# Patient Record
Sex: MALE | Race: BLACK OR AFRICAN AMERICAN | NOT HISPANIC OR LATINO | Employment: FULL TIME | URBAN - METROPOLITAN AREA
[De-identification: names, ages, dates, MRNs, and addresses within clinical notes are randomized per-mention and may not be internally consistent; named-entity substitution may affect disease eponyms.]

---

## 2024-05-16 ENCOUNTER — OFFICE VISIT (OUTPATIENT)
Dept: URGENT CARE | Facility: CLINIC | Age: 24
End: 2024-05-16

## 2024-05-16 ENCOUNTER — HOSPITAL ENCOUNTER (OUTPATIENT)
Dept: RADIOLOGY | Facility: HOSPITAL | Age: 24
Discharge: HOME | End: 2024-05-16
Attending: NURSE PRACTITIONER

## 2024-05-16 VITALS
OXYGEN SATURATION: 99 % | RESPIRATION RATE: 18 BRPM | HEART RATE: 57 BPM | WEIGHT: 165 LBS | HEIGHT: 71 IN | SYSTOLIC BLOOD PRESSURE: 123 MMHG | DIASTOLIC BLOOD PRESSURE: 69 MMHG | TEMPERATURE: 98 F | BODY MASS INDEX: 23.1 KG/M2

## 2024-05-16 DIAGNOSIS — M25.521 RIGHT ELBOW PAIN: ICD-10-CM

## 2024-05-16 DIAGNOSIS — S42.401A OCCULT CLOSED FRACTURE OF RIGHT ELBOW, INITIAL ENCOUNTER: Primary | ICD-10-CM
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
## 2024-05-16 DIAGNOSIS — M25.422 ELBOW EFFUSION, LEFT: ICD-10-CM

## 2024-05-16 PROCEDURE — 99214 OFFICE O/P EST MOD 30 MIN: CPT | Mod: PBBFAC,25 | Performed by: NURSE PRACTITIONER

## 2024-05-16 PROCEDURE — 99203 OFFICE O/P NEW LOW 30 MIN: CPT | Mod: S$PBB,,, | Performed by: NURSE PRACTITIONER

## 2024-05-16 PROCEDURE — 63600175 PHARM REV CODE 636 W HCPCS

## 2024-05-16 PROCEDURE — 73080 X-RAY EXAM OF ELBOW: CPT | Mod: TC,RT

## 2024-05-16 RX ORDER — KETOROLAC TROMETHAMINE 30 MG/ML
60 INJECTION, SOLUTION INTRAMUSCULAR; INTRAVENOUS
Status: COMPLETED | OUTPATIENT
Start: 2024-05-16 | End: 2024-05-16

## 2024-05-16 RX ADMIN — KETOROLAC TROMETHAMINE 60 MG: 30 INJECTION, SOLUTION INTRAMUSCULAR at 10:05

## 2024-05-16 NOTE — PROGRESS NOTES
"Notified patient that there is a "occult fracture" with effusion which indicates there is somewhat a reliable indication there may be an intraarticular fracture although no fracture was identified.   Ortho referral sent today.  Keep elbow splint in place and return to clinic for repeat xray In 7-10 days if Ortho evaluation has not been fulfilled. Pt verbalized understanding.  No PE/Sports/Dance/etc until cleared by Orthopedic MD.   -If you see color change in your fingers (blue, black, purple, white) go to ER.  -Tylenol or Motrin for pain. Motrin as prescribed   -Limit icing your wrist to maximum 3x/day, 10minutes/session.  -Wear the  splint every day to work/during activity. Remove for showering, sleeping, resting.  -If sudden severe pain or swelling in wrist, arm, or splint feels too tight, go to ER.  If you are no better after 2 weeks, return to this clinic for a referral to [facility] for further evaluation and imaging.  "

## 2024-05-16 NOTE — PROGRESS NOTES
"Subjective:      Patient ID: aMssimo Novak is a 23 y.o. male.    Vitals:  height is 5' 11" (1.803 m) and weight is 74.8 kg (165 lb). His oral temperature is 98.3 °F (36.8 °C). His blood pressure is 123/69 and his pulse is 57 (abnormal). His respiration is 18 and oxygen saturation is 99%.     Chief Complaint: Arm Injury (R elbow pain from fall x 2 hours )    Arm Injury     Pt presents with c/o right elbow pain after a trip and fall while playing basket ball. Pt states he tried to block his opponent form making a basket tripped over another players leg, fell down from standing height to the floor to face and elbow on right side. Pt denies LOC but states he felt dazed after fall, Pt was able to get up after fall without help. Pt denies numbness and tingling in extremity, but c/o of not being able to flex, extend right arm without pain. Pt is right hand dominant  ROS   Objective:     Physical Exam   Constitutional: He is oriented to person, place, and time. He appears well-developed.  Non-toxic appearance. He does not appear ill. No distress.   HENT:   Head: Normocephalic and atraumatic.   Nose: No purulent discharge. Right sinus exhibits no maxillary sinus tenderness and no frontal sinus tenderness. Left sinus exhibits no maxillary sinus tenderness and no frontal sinus tenderness.   Mouth/Throat: Uvula is midline.   Eyes: Right eye exhibits no discharge. Left eye exhibits no discharge. Extraocular movement intact   Neck: Neck supple. No neck rigidity present.   Cardiovascular: Regular rhythm.   Pulmonary/Chest: Effort normal and breath sounds normal. No respiratory distress. He has no wheezes. He has no rales.   Abdominal: Normal appearance.   Musculoskeletal:         General: Swelling, tenderness, deformity and signs of injury present.      Right elbow: He exhibits swelling. Tenderness found. Radial head, medial epicondyle, lateral epicondyle and olecranon process tenderness noted.      Right lower leg: No edema.      " Left lower leg: No edema.      Comments: Pt is tender to elbow joint, there is swelling and mild deformity at media elbow, exam minimal due to pain, there is no discoloration, brisk cap refill to fingers, strong proximal and distal pulses to right upper extremity and wrist.    Lymphadenopathy:     He has no cervical adenopathy.   Neurological: no focal deficit. He is alert and oriented to person, place, and time.   Skin: Skin is warm, dry and not diaphoretic. Capillary refill takes less than 2 seconds.   Psychiatric: His behavior is normal. Mood, judgment and thought content normal.   Nursing note and vitals reviewed.      Assessment:     1. Right elbow pain    2. Fall, initial encounter        Plan:   ER precautions given  X-ray performed to rule out acute fracture until assess for malalignment, xray reviewed. Will notify for abnormal results.  Placed in Posterior elbow/humerus splint with ortho glass   Instructed to:  -Keep splint in place until you get a call from this clinic for further instruction; you can also check your patient portal for xray results  No PE/Sports/Dance/etc until cleared by Orthopedic MD.   -If you see color change in your fingers (blue, black, purple, white) go to ER.  -Tylenol or Motrin for pain. Motrin as prescribed   -Limit icing your wrist to maximum 3x/day, 10minutes/session.  -Wear the  splint every day to work/during activity. Remove for showering, sleeping, resting.  -If sudden severe pain or swelling in wrist, arm, or splint feels too tight, go to ER.  If you are no better after 2 weeks, return to this clinic for a referral to [facility] for further evaluation and imaging.  Right elbow pain  -     X-Ray Elbow Complete 3 views Right; Future; Expected date: 05/16/2024    Fall, initial encounter  -     X-Ray Elbow Complete 3 views Right; Future; Expected date: 05/16/2024    Other orders  -     ketorolac injection 60 mg

## 2024-05-16 NOTE — LETTER
May 16, 2024      [facility]  [facility] [address]  Phone: [phone]       Patient: Massimo Novak   YOB: 2000  Date of Visit: 05/16/2024    To Whom It May Concern:    Karen Novak  was at [facility] on 05/16/2024. The patient may return to work/school on 5/18/2024 with no restrictions. If you have any questions or concerns, or if I can be of further assistance, please do not hesitate to contact me.    Sincerely,    YANN Yusuf

## 2024-05-16 NOTE — PATIENT INSTRUCTIONS
Please follow instructions on patient education material.      Return to urgent care in 2 to 3 days if symptoms are not improving, immediately if you develop any new or worsening symptoms.   X-ray performed to rule out acute fracture until assess for malalignment, xray reviewed. Will notify for abnormal results.  Please use the prescriptions that were sent for you.   Please read the attached information about NSAIDs.  You got a Toradol shot in clinic today. For the next 8 hours do not take:  Ibuprofen  Naproxen  Advil  Aleve  Motrin  Ketorolac   Diclofenac  Meloxicam   -Keep splint in place until you get a call from this clinic for further instruction; you can also check your patient portal for xray results  -No PE/Sports/Dance/etc until cleared by Orthopedic MD.   -If you see color change in your fingers (blue, black, purple, white) go to ER.  -Tylenol or Motrin for pain. Motrin as prescribed   -Limit icing your wrist to maximum 3x/day, 10minutes/session.  -Wear the  splint every day to work/during activity. Remove for showering, sleeping, resting.  -If sudden severe pain or swelling in wrist, arm, or splint feels too tight, go to ER.  If you are no better after 2 weeks, return to this clinic for a referral to [facility] for further evaluation and imaging.

## 2024-05-24 ENCOUNTER — HOSPITAL ENCOUNTER (EMERGENCY)
Facility: HOSPITAL | Age: 24
Discharge: HOME | End: 2024-05-24
Attending: STUDENT IN AN ORGANIZED HEALTH CARE EDUCATION/TRAINING PROGRAM

## 2024-05-24 VITALS
DIASTOLIC BLOOD PRESSURE: 65 MMHG | TEMPERATURE: 98 F | BODY MASS INDEX: 23.06 KG/M2 | HEART RATE: 59 BPM | SYSTOLIC BLOOD PRESSURE: 115 MMHG | RESPIRATION RATE: 18 BRPM | WEIGHT: 165.38 LBS | OXYGEN SATURATION: 100 %

## 2024-05-24 DIAGNOSIS — S52.134A CLOSED NONDISPLACED FRACTURE OF NECK OF RIGHT RADIUS, INITIAL ENCOUNTER: Primary | ICD-10-CM

## 2024-05-24 DIAGNOSIS — M25.521 RIGHT ELBOW PAIN: ICD-10-CM

## 2024-05-24 PROCEDURE — 29105 APPLICATION LONG ARM SPLINT: CPT | Mod: RT

## 2024-05-24 PROCEDURE — 25000003 PHARM REV CODE 250: Performed by: NURSE PRACTITIONER

## 2024-05-24 PROCEDURE — 99283 EMERGENCY DEPT VISIT LOW MDM: CPT | Mod: 25

## 2024-05-24 RX ORDER — INDOMETHACIN 25 MG/1
25 CAPSULE ORAL 2 TIMES DAILY PRN
Qty: 14 CAPSULE | Refills: 0 | Status: SHIPPED | OUTPATIENT
Start: 2024-05-24

## 2024-05-24 RX ORDER — KETOROLAC TROMETHAMINE 10 MG/1
10 TABLET, FILM COATED ORAL
Status: COMPLETED | OUTPATIENT
Start: 2024-05-24 | End: 2024-05-24

## 2024-05-24 RX ADMIN — KETOROLAC TROMETHAMINE 10 MG: 10 TABLET, FILM COATED ORAL at 05:05

## 2024-05-24 NOTE — Clinical Note
"Massimo"Leanne Novak was seen and treated in our emergency department on 5/24/2024.  He may return to work on 05/26/2024.  Wear splint and sling until cleared by Orthopedic services. No heavy lifting until evaluated by Orthopedic services     If you have any questions or concerns, please don't hesitate to call.      Negro Medrano Jr., FNP"

## 2024-05-24 NOTE — ED PROVIDER NOTES
Encounter Date: 5/24/2024       History     Chief Complaint   Patient presents with    Arm Pain     PT W REPORT OF RT ELBOW INJURY 5/16/24.  PT WEARING SPLINT AT PRESENT BUT DOES REPORT TAKING IT OFF MULT TIMES.  CO RT HAND SWELLING. NVI DISTAL TO INJURY.      Pt is a 23 y.o. male who presents to the [facility] ED complaining of continued Rt elbow pain sine he fell and struck the area while playing basketball on 5/16. Reports being seen at a local Urgent Clinic and was told that he had a fracture to elbow. Was placed in a splint and instructed to seek Orthopedic evaluation. Pt denies receiving follow up and is complaining of continued pain to the affected elbow. Splint in place to extremity however it is currently misaligned. Pt admits to removing splint since initial injury for comfort. Denies coolness to affected extremity, redness to affected joint, chest pain, SOB, weakness, or dizziness.       Review of patient's allergies indicates:  No Known Allergies  No past medical history on file.  No past surgical history on file.  No family history on file.  Social History     Tobacco Use    Smoking status: Every Day     Types: Cigars    Smokeless tobacco: Never   Substance Use Topics    Alcohol use: Yes     Comment: occ    Drug use: Not Currently     Review of Systems   Constitutional:  Negative for chills, diaphoresis, fatigue and fever.   HENT:  Negative for facial swelling, postnasal drip, rhinorrhea, sinus pressure, sinus pain, sore throat and trouble swallowing.    Respiratory:  Negative for cough, chest tightness, shortness of breath and wheezing.    Cardiovascular:  Negative for chest pain, palpitations and leg swelling.   Gastrointestinal:  Negative for abdominal pain, diarrhea, nausea and vomiting.   Genitourinary:  Negative for dysuria, flank pain, hematuria and urgency.   Musculoskeletal:  Positive for arthralgias. Negative for back pain and myalgias.   Skin:  Negative for color change and rash.   Neurological:   Negative for dizziness, syncope, weakness and headaches.   Hematological:  Does not bruise/bleed easily.   All other systems reviewed and are negative.      Physical Exam     Initial Vitals [05/24/24 1723]   BP Pulse Resp Temp SpO2   115/65 (!) 59 18 97.9 °F (36.6 °C) 100 %      MAP       --         Physical Exam    Nursing note and vitals reviewed.  Constitutional: Vital signs are normal. He appears well-developed and well-nourished.   HENT:   Head: Normocephalic.   Nose: Nose normal.   Mouth/Throat: Oropharynx is clear and moist.   Eyes: Conjunctivae and EOM are normal. Pupils are equal, round, and reactive to light.   Neck: Neck supple.   Normal range of motion.  Cardiovascular:  Normal rate, regular rhythm, normal heart sounds and intact distal pulses.           Pulmonary/Chest: Effort normal and breath sounds normal. No respiratory distress. He has no wheezes. He has no rhonchi. He has no rales. He exhibits no tenderness.   Abdominal: Abdomen is soft and flat. Bowel sounds are normal. There is no abdominal tenderness. There is no rebound, no guarding, no tenderness at McBurney's point and negative Gilliland's sign.   Musculoskeletal:         General: Normal range of motion.      Right elbow: Swelling present. Normal range of motion. Tenderness present.        Arms:       Cervical back: Normal range of motion and neck supple.     Neurological: He is alert and oriented to person, place, and time. He has normal strength.   Skin: Skin is warm and dry. Capillary refill takes less than 2 seconds.   Psychiatric: He has a normal mood and affect. His behavior is normal. Judgment and thought content normal.         ED Course   Splint Application    Date/Time: 5/24/2024 6:36 PM    Performed by: Negro Medrano Jr., FNP  Authorized by: Negro Medrano Jr., FNP  Location details: right elbow  Splint type: long arm  Supplies used: cotton padding, elastic bandage and Ortho-Glass  Post-procedure: The splinted body part was  neurovascularly unchanged following the procedure.  Patient tolerance: Patient tolerated the procedure well with no immediate complications        Labs Reviewed - No data to display       Imaging Results              X-Ray Elbow Complete Right (Final result)  Result time 05/24/24 18:01:50      Final result by Adri Timmons MD (05/24/24 18:01:50)                   Impression:      Suspected nondisplaced radial neck fracture.      Electronically signed by: Adri Timmons  Date:    05/24/2024  Time:    18:01               Narrative:    EXAMINATION:  XR ELBOW COMPLETE 3 VIEW RIGHT    CLINICAL HISTORY:  Pain in right elbow    COMPARISON:  X-rays dated 05/16/2024    FINDINGS:  Evaluation is limited by positioning.  There is question of a nondisplaced radial neck fracture.  There is no elbow joint effusion.                                       Medications   ketorolac tablet 10 mg (10 mg Oral Given 5/24/24 1739)     Medical Decision Making  Differential:  Arm fracture      Amount and/or Complexity of Data Reviewed  Radiology: ordered.    Risk  Prescription drug management.               ED Course as of 05/24/24 1842   Fri May 24, 2024   1835 Given strict ED return precautions. I have spoken with the patient and/or caregivers. I have explained the patient's condition, diagnoses and treatment plan based on the information available to me at this time. I have answered the patient's and/or caregiver's questions and addressed any concerns. The patient and/or caregivers have as good an understanding of the patient's diagnosis, condition and treatment plan as can be expected at this point. The vital signs have been stable. The patient's condition is stable and appropriate for discharge from the emergency department.      The patient will pursue further outpatient evaluation with the primary care physician or other designated or consulting physician as outlined in the discharge instructions. The patient and/or caregivers  are agreeable to this plan of care and follow-up instructions have been explained in detail. The patient and/or caregivers have received these instructions in written format and have expressed an understanding of the discharge instructions. The patient and/or caregivers are aware that any significant change in condition or worsening of symptoms should prompt an immediate return to this or the closest emergency department or a call to 911.   [JA]      ED Course User Index  [JA] Negro Medrano Jr., FNP                           Clinical Impression:  Final diagnoses:  [M25.521] Right elbow pain  [S52.134A] Closed nondisplaced fracture of neck of right radius, initial encounter (Primary)          ED Disposition Condition    Discharge Stable          ED Prescriptions       Medication Sig Dispense Start Date End Date Auth. Provider    indomethacin (INDOCIN) 25 MG capsule Take 1 capsule (25 mg total) by mouth 2 (two) times daily as needed (pain). 14 capsule 5/24/2024 -- Negro Medrano Jr., YANN          Follow-up Information       Follow up With Specialties Details Why Contact Info    [facility]  Schedule an appointment as soon as possible for a visit in 5 days For follow up with Orthopedic Clinic in next 5-7 days to establish care [address] [place] [id number]    [facility] Emergency Medicine In 3 days As needed, If symptoms worsen [address] [place] [id number] [phone]             Negro Medrano Jr., FNP  05/24/24 8248

## 2024-05-24 NOTE — DISCHARGE INSTRUCTIONS
Wear splint as directed per ED instructions.  Take pain medication as prescribed.  Follow up with the [facility] as instructed.   Return to the [facility] ED immediately for coolness to affected extremity, worsening pain, or loss of sensation to affected extremity.

## 2024-06-10 ENCOUNTER — OFFICE VISIT (OUTPATIENT)
Dept: ORTHOPEDICS | Facility: CLINIC | Age: 24
End: 2024-06-10

## 2024-06-10 ENCOUNTER — HOSPITAL ENCOUNTER (OUTPATIENT)
Dept: RADIOLOGY | Facility: HOSPITAL | Age: 24
Discharge: HOME | End: 2024-06-10
Attending: STUDENT IN AN ORGANIZED HEALTH CARE EDUCATION/TRAINING PROGRAM

## 2024-06-10 VITALS — WEIGHT: 164 LBS | BODY MASS INDEX: 22.96 KG/M2 | HEIGHT: 71 IN

## 2024-06-10 DIAGNOSIS — S52.134A CLOSED NONDISPLACED FRACTURE OF NECK OF RIGHT RADIUS, INITIAL ENCOUNTER: ICD-10-CM

## 2024-06-10 DIAGNOSIS — M25.521 RIGHT ELBOW PAIN: ICD-10-CM

## 2024-06-10 PROCEDURE — 99213 OFFICE O/P EST LOW 20 MIN: CPT | Mod: PBBFAC,25

## 2024-06-10 PROCEDURE — 73080 X-RAY EXAM OF ELBOW: CPT | Mod: TC,RT

## 2024-06-10 PROCEDURE — 99204 OFFICE O/P NEW MOD 45 MIN: CPT | Mod: S$PBB,,, | Performed by: STUDENT IN AN ORGANIZED HEALTH CARE EDUCATION/TRAINING PROGRAM

## 2024-06-10 NOTE — PROGRESS NOTES
[facility] Orth[facility] Clinic Note    In brief, Massimo Novak is a 23 y.o. male right-hand dominant calos presents to clinic today for evaluation of right elbow pain.  Patient states he fell on his right elbow about a month ago playing basketball.  Had a little bit of swelling and some pain.  It is improved.  Denies numbness tingling.      PMH: History reviewed. No pertinent past medical history.    PSH: History reviewed. No pertinent surgical history.    SH:   Social History     Socioeconomic History    Marital status: Single   Tobacco Use    Smoking status: Every Day     Types: Cigars    Smokeless tobacco: Never   Substance and Sexual Activity    Alcohol use: Yes     Comment: occ    Drug use: Not Currently    Sexual activity: Yes     Partners: Female       FH: No family history on file.    Allergies: Review of patient's allergies indicates:  No Known Allergies    ROS:  Constitutional- no fever, fatigue, weakness  Eye- no vision loss, eye redness, drainage, or pain  ENMT- no sore throat, ear pain, sinus pain/congestion, nasal congestion/drainage  Respiratory- no cough, wheezing, or shortness of breath  CV- no chest pain, no palpitations, no edema  GI- no N/V/D; no abdominal pain    Physical Exam:  There were no vitals filed for this visit.    General: NAD  Cardio: RRR by peripheral pulse  Pulm: Normal WOB on room air, symmetric chest rise  Abd: Soft, NT/ND    MSK:  RUE-  No gross abnormality deformity   Nontender to palpation over radial head   Lacking about 15° of extension   Full pronation supination   Neurovascularly intact    Imaging:   X-ray of the right elbow obtained and interpreted in clinic today shows nondisplaced radial neck fracture    Assessment:  23-year-old male with nondisplaced right radial neck fracture    -range of motion as tolerated, activity as tolerated, follow up as needed    Aidan Patten MD  [facility] Orthopaedic Surgery  6/10/2024 2:42 PM

## 2024-06-11 NOTE — PROGRESS NOTES
Faculty Attestation: Massimo Novak  was seen at [facility] in the Orthopaedic Clinic. Patient seen and evaluated at the time of the visit. History of Present Illness, Physical Exam, and Assessment and Plan reviewed. Treatment plan is reasonable and appropriate. Compliance with treatment recommendations is important. No procedure was performed.     Ashwin Shearer MD  Orthopaedic Surgery